# Patient Record
Sex: FEMALE | Race: WHITE | Employment: OTHER | URBAN - METROPOLITAN AREA
[De-identification: names, ages, dates, MRNs, and addresses within clinical notes are randomized per-mention and may not be internally consistent; named-entity substitution may affect disease eponyms.]

---

## 2022-03-18 PROBLEM — R10.9 ABDOMINAL PAIN: Status: ACTIVE | Noted: 2020-08-17

## 2022-03-18 PROBLEM — K80.20 CHOLELITHIASIS: Status: ACTIVE | Noted: 2020-08-17

## 2022-03-20 PROBLEM — R79.89 ELEVATED LFTS: Status: ACTIVE | Noted: 2020-08-17

## 2023-06-19 ENCOUNTER — OFFICE VISIT (OUTPATIENT)
Age: 28
End: 2023-06-19
Payer: COMMERCIAL

## 2023-06-19 VITALS — BODY MASS INDEX: 36.93 KG/M2 | HEIGHT: 66 IN

## 2023-06-19 DIAGNOSIS — M25.511 CHRONIC RIGHT SHOULDER PAIN: ICD-10-CM

## 2023-06-19 DIAGNOSIS — E66.9 OBESITY, UNSPECIFIED CLASSIFICATION, UNSPECIFIED OBESITY TYPE, UNSPECIFIED WHETHER SERIOUS COMORBIDITY PRESENT: ICD-10-CM

## 2023-06-19 DIAGNOSIS — G89.29 CHRONIC BILATERAL LOW BACK PAIN WITH BILATERAL SCIATICA: ICD-10-CM

## 2023-06-19 DIAGNOSIS — G89.29 CHRONIC RIGHT SHOULDER PAIN: ICD-10-CM

## 2023-06-19 DIAGNOSIS — M54.2 NECK PAIN: Primary | ICD-10-CM

## 2023-06-19 DIAGNOSIS — G57.13 MERALGIA PARESTHETICA OF BOTH LOWER EXTREMITIES: ICD-10-CM

## 2023-06-19 DIAGNOSIS — M41.25 IDIOPATHIC SCOLIOSIS OF THORACOLUMBAR REGION: ICD-10-CM

## 2023-06-19 DIAGNOSIS — M54.41 CHRONIC BILATERAL LOW BACK PAIN WITH BILATERAL SCIATICA: ICD-10-CM

## 2023-06-19 DIAGNOSIS — M54.42 CHRONIC BILATERAL LOW BACK PAIN WITH BILATERAL SCIATICA: ICD-10-CM

## 2023-06-19 PROCEDURE — 99204 OFFICE O/P NEW MOD 45 MIN: CPT | Performed by: FAMILY MEDICINE

## 2023-06-19 ASSESSMENT — PATIENT HEALTH QUESTIONNAIRE - PHQ9
1. LITTLE INTEREST OR PLEASURE IN DOING THINGS: 0
SUM OF ALL RESPONSES TO PHQ QUESTIONS 1-9: 0
SUM OF ALL RESPONSES TO PHQ9 QUESTIONS 1 & 2: 0
2. FEELING DOWN, DEPRESSED OR HOPELESS: 0
SUM OF ALL RESPONSES TO PHQ QUESTIONS 1-9: 0

## 2023-06-19 NOTE — PATIENT INSTRUCTIONS
Issues:  - decreased range of motion of neck   - nerve tension in your arms (they are supposed to slide)  - RIGHT shoulder subacromial impingement  - scoliosis -> limitations in thoracic and lumbar range of motion  - sacroiliac joint dysfunction RIGHT > LEFT   - LEFT greater troch bursitis  - meralgia paresthesia      To Do:  - x-rays: neck, RIGHT shoulder, low back  - sample from my home exercise plan menu (I'll place * next to the ones I think you should start with)    Short-term goal(s):  - less numbness/tingling in the arms  - less frequent back spasm episodes    Long-term goal(s):  - awareness of how to [employer] tower\" to optimize your mechanics for work (and \"for life\" really)  - learn techniques on how to relieve symptoms that may occur when you are pregnant, since your body will be changing constantly for a while when that happens        VISIT SURVEY       You may receive a survey regarding your visit today either by mail or email. Please take the opportunity to let us know how we did. This information helps us continue to improve and provide a great patient experience. TESTING / IMAGING RESULTS       If you have [employer] access:  Per federal regulations all of your results will be released to you and to your physician / provider simultaneously on [address] This means that it is likely that you will have the opportunity to review your results before your physician / provider! Since all \"critical\" abnormal results are immediately called to the office / on-call providers on nights, weekends and holidays - please refrain from calling after hours when you receive abnormal results through [address] Instead, allow time for your physician / provider to review your results and then provide interpretation and/or guidance. If the results are significantly abnormal and require time-sensitive action - guidance will be provided both via [employer] and via telephone.   For all other results

## 2023-06-19 NOTE — PROGRESS NOTES
Charo Acostaodalysradha presents today for   Chief Complaint   Patient presents with    Back Pain    Shoulder Pain     right    Neck Pain       Is someone accompanying this pt? no    Is the patient using any DME equipment during OV? no    Depression Screening:  No flowsheet data found. Learning Assessment:  No flowsheet data found. Abuse Screening:  No flowsheet data found. Fall Risk  No flowsheet data found. OPIOID RISK TOOL  No flowsheet data found. Coordination of Care:  1. Have you been to the ER, urgent care clinic since your last visit? Yes [facility]  Hospitalized since your last visit? no    2. Have you seen or consulted any other health care providers outside of the [address] [place] [address] since your last visit? no Include any pap smears or colon screening.  no
numbness/tingling occurs 4-5x/day; which causes (+) sleep disturbance    Back episodes:  Every few months lasting a few days  Uses: stretches / hamstring, heating pad      Least pain over the last 2 weeks has been 0 of 10  Worst pain over the last 2 weeks has been 8 of 10 - spasms - low back   Current 0 of 10    Aggravating factors:   Side lying, standing or sitting worsens lateral thigh numbness    Alleviating factors: With some weight loss, some techniques from physical therapy    Neurologic symptoms:   (+) numbness, tingling  (-) weakness  (+)  bowel or bladder changes \"it physically hurts to push the poop out when it is that painful\"      Prior Treatments for this complaint:   1.5yrs of bracing in middle school / Dr. Dominic Erazo @ [facility]    Previous Medications for this complaint:   MSK relaxants  Cyclobenzaprine (Flexeril)  naproxen    Current Medications for this complaint:  none    Previous work-up for this complaint has included:   None within the last few years       has a past medical history of Hypothyroid, PCOS (polycystic ovarian syndrome), and Scoliosis. Past Surgical History:   Procedure Laterality Date    CHOLECYSTECTOMY  2020    WISDOM TOOTH EXTRACTION        reports that she has never smoked. She has never been exposed to tobacco smoke. She has never used smokeless tobacco. She reports current alcohol use. She reports that she does not use drugs.   Family History   Problem Relation Age of Onset    Stroke Mother     Hypertension Mother     Hyperparathyroidism  Mother     Ovarian Cancer Maternal Grandmother     Heart Attack Maternal Grandfather      Allergies   Allergen Reactions    Guava Flavor Other (See Comments)     breakout    Mangifera Indica Swelling    Eduardo Flavor Other (See Comments)     breakout    Papaya Swelling    Papaya Derivatives Other (See Comments)     breakout    Passion Fruit Flavor Other (See Comments)     breakout       Problem List:     Patient Active Problem List   Diagnosis

## 2023-07-03 ENCOUNTER — HOSPITAL ENCOUNTER (OUTPATIENT)
Facility: HOSPITAL | Age: 28
Setting detail: RECURRING SERIES
Discharge: HOME | End: 2023-07-06
Payer: COMMERCIAL

## 2023-07-03 PROCEDURE — 97162 PT EVAL MOD COMPLEX 30 MIN: CPT
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
## 2023-07-03 NOTE — PROGRESS NOTES
analyze and address functional mobility deficits, analyze and address ROM deficits, analyze and address strength deficits, analyze and address soft tissue restrictions, analyze and cue for proper movement patterns, analyze and modify for postural abnormalities, and instruct in home and community integration to address functional deficits and attain remaining goals.     Progress toward goals / Updated goals:  [x]  See POC      PLAN  yes Continue plan of care  []  Upgrade activities as tolerated  []  Discharge due to :  []  Other:    Maulik Braun PT    7/3/2023    9:46 AM    Future Appointments   Date Time Provider [address] [place] [address]   7/3/2023 11:00 AM CHAPIN Dorantes

## 2023-07-17 ENCOUNTER — APPOINTMENT (OUTPATIENT)
Facility: HOSPITAL | Age: 28
End: 2023-07-17
Payer: COMMERCIAL

## 2023-07-24 ENCOUNTER — APPOINTMENT (OUTPATIENT)
Facility: HOSPITAL | Age: 28
End: 2023-07-24
Payer: COMMERCIAL

## 2023-08-01 ENCOUNTER — HOSPITAL ENCOUNTER (OUTPATIENT)
Facility: HOSPITAL | Age: 28
Setting detail: RECURRING SERIES
Discharge: HOME | End: 2023-08-04
Payer: COMMERCIAL

## 2023-08-01 PROCEDURE — 97110 THERAPEUTIC EXERCISES: CPT

## 2023-08-01 PROCEDURE — 97530 THERAPEUTIC ACTIVITIES: CPT

## 2023-08-01 PROCEDURE — 97112 NEUROMUSCULAR REEDUCATION: CPT

## 2023-08-04 NOTE — PROGRESS NOTES
PHYSICAL / OCCUPATIONAL THERAPY - DAILY TREATMENT NOTE (updated )  For Eval visit    Patient Name: Amanda Brown    Date: 2023    : 1995  Insurance: Payor: Garrett Crawford / Plan: OPTIMA HMO / Product Type: *No Product type* /      Patient  verified yes     Visit #   Current / Total 2 4   Time   In / Out 9:47 10:43   Pain   In / Out 3/10 back  4/10 shoulder  1/10 neck 3/10 back  0/10 shoulder  2/10 neck   Subjective Functional Status/Changes: Pt reports that she is in the process of fertility treatment during current treatment and is advised to not complete strenuous activities during this time. Pt is absent during this reporting period d/t financial troubles, plans to continue x1/ week     TREATMENT AREA =  see POC    OBJECTIVE  Modalities Rationale:     decrease inflammation and decrease pain to improve patient's ability to progress to PLOF and address remaining functional goals. min [] Estim Unattended, type/location:                                      []  w/ice    []  w/heat    min [] Estim Attended, type/location:                                     []  w/US     []  w/ice    []  w/heat    []  TENS insruct      min []  Mechanical Traction: type/lbs                   []  pro   []  sup   []  int   []  cont    []  before manual    []  after manual    min []  Ultrasound, settings/location:      min []  Iontophoresis w/ dexamethasone, location:                                               []  take home patch       []  in clinic   10 min  unbill []  Ice     [x]  Heat    location/position:     min []  Paraffin,  details:     min []  Vasopneumatic Device, press/temp:     min []  Kalyn Sidhu / Kim De Paz:     If using vaso (only need to measure limb vaso being performed on)      pre-treatment girth :       post-treatment girth :       measured at (landmark location) :      min []  Other:    Skin assessment post-treatment (if applicable):    []  intact    []  redness- no adverse reaction
visual trembling at 7\"  Improve B hip flexor flexibility by at least 10 deg to reduce B LE paresthesias  Current PN: Hip flexor flexibility (R) -7 (L) -7  Improve mid/low trap MMT by at least 1/3 grade to improve posture when performing work tasks        Current PN: Mid trap (R) 4/5 (L) 4/5, Low trap (R) 3+/5 (L) 3+/5  Improve FOTO Score to >/= 71/100 (shoulder) to indicate improved function  Current PN: assess at next PN or in 30 days  Improve FOTO score to >/= 65/100 (back) to indicate improved function  Current PN: assess at next PN or in 30 days      Frequency / Duration:   Patient to be seen   1   times per week for   4    weeks:    RECOMMENDATIONS  We would like to continue therapy for progress to goals stated above. Continue per initial Plan of Care. If you have any questions/comments please contact us directly. Thank you for allowing us to assist in the care of your patient.     Elkin Andre, ASHLEY       8/4/2023       11:56 AM

## 2023-08-17 ENCOUNTER — HOSPITAL ENCOUNTER (OUTPATIENT)
Facility: HOSPITAL | Age: 28
Setting detail: RECURRING SERIES
Discharge: HOME | End: 2023-08-20
Payer: COMMERCIAL

## 2023-08-17 PROCEDURE — 97140 MANUAL THERAPY 1/> REGIONS: CPT

## 2023-08-17 PROCEDURE — 97530 THERAPEUTIC ACTIVITIES: CPT

## 2023-08-17 PROCEDURE — 97110 THERAPEUTIC EXERCISES: CPT

## 2023-08-17 NOTE — PROGRESS NOTES
increased tension in L>R upper trap and levator scapulae with manual stretching/intervention today. Pt did report increased relief from manual intervention focused to cervical versus lumbar region today. Will review detailed progress/goals for physician update/progress note next treatment with continuing to progress/advance as tolerated within current POC. Patient will continue to benefit from skilled PT / OT services to modify and progress therapeutic interventions, analyze and address functional mobility deficits, analyze and address ROM deficits, analyze and address strength deficits, analyze and address soft tissue restrictions, analyze and cue for proper movement patterns, analyze and modify for postural abnormalities, and instruct in home and community integration to address functional deficits and attain remaining goals.     Progress toward goals / Updated goals:  []  See Progress Note/Recertification  New Goals to be achieved in 4 weeks:  Pt will be ind and compliant with HEP for self management of sx  Curret PN: HEP updated at today's visit, reports semi compliance with initial HEP 8/17/23: Met, pt reports increased compliance with HEP since last visit  Pt will obtain B wrist night splints to reduce compression to carpal tunnel and improve ability to perform fine motor tasks  Current PN: Pt has not received wrist splints since eval, planning on obtaining splints before next visit  Pt will maintain deep cervical flexion > 15\" without compensation to improve neck stability when performing ADLs  Current PN: 10\" before compensation, visual trembling at 7\"  Improve B hip flexor flexibility by at least 10 deg to reduce B LE paresthesias  Current PN: Hip flexor flexibility (R) -7 (L) -7  Improve mid/low trap MMT by at least 1/3 grade to improve posture when performing work tasks        Current PN: Mid trap (R) 4/5 (L) 4/5, Low trap (R) 3+/5 (L) 3+/5  Improve FOTO Score to >/= 71/100 (shoulder) to indicate

## 2023-08-28 ENCOUNTER — APPOINTMENT (OUTPATIENT)
Facility: HOSPITAL | Age: 28
End: 2023-08-28
Payer: COMMERCIAL

## 2023-09-11 ENCOUNTER — HOSPITAL ENCOUNTER (OUTPATIENT)
Facility: HOSPITAL | Age: 28
Setting detail: RECURRING SERIES
Discharge: HOME | End: 2023-09-14
Payer: COMMERCIAL

## 2023-09-11 PROCEDURE — 97530 THERAPEUTIC ACTIVITIES: CPT

## 2023-09-11 PROCEDURE — 97110 THERAPEUTIC EXERCISES: CPT

## 2023-09-11 NOTE — PROGRESS NOTES
PHYSICAL / OCCUPATIONAL THERAPY - DAILY TREATMENT NOTE (updated )    Patient Name: Kristy Morgan    Date: 2023    : 1995  Insurance: Payor: Coleman Vaca / Plan: OPTIMA HMO / Product Type: *No Product type* /      Patient  verified Yes     Visit #   Current / Total 4 4   Time   In / Out 1:00 2:10   Pain   In / Out 4- back, 6-neck,  7-shoulder 2-back  6- neck  4- shoulder   Subjective Functional Status/Changes: See d/c     TREATMENT AREA =  Cervicalgia [M54.2]  Other low back pain [M54.59]  Pain in right shoulder [M25.511]    OBJECTIVE    Modalities Rationale:     decrease pain and increase tissue extensibility to improve patient's ability to progress to PLOF and address remaining functional goals. min [] Estim Unattended, type/location:                                      []  w/ice    []  w/heat    min [] Estim Attended, type/location:                                     []  w/US     []  w/ice    []  w/heat    []  TENS insruct      min []  Mechanical Traction: type/lbs                   []  pro   []  sup   []  int   []  cont    []  before manual    []  after manual    min []  Ultrasound, settings/location:      min []  Iontophoresis w/ dexamethasone, location:                                               []  take home patch       []  in clinic   10 min  unbill []  Ice     [x]  Heat    location/position: Cervical/lumbar in sitting     min []  Paraffin,  details:     min []  Vasopneumatic Device, press/temp:     min []  Emily Ragyoza / Víctor Dayhoff: If using vaso (only need to measure limb vaso being performed on)      pre-treatment girth :       post-treatment girth :       measured at (landmark location) :      min []  Other:     Skin assessment post-treatment (if applicable):    [x]  intact    [x]  redness- no adverse reaction                 []redness - adverse reaction:         Therapeutic Procedures:     Tx Min Billable or 1:1 Min (if diff from Tx Min) Procedure, Rationale, Specifics   [phone]

## 2023-09-11 NOTE — PROGRESS NOTES
[facility]  [address] [address], [place] [facility]  Phone: [phone]   [phone]  PHYSICAL THERAPY DISCHARGE SUMMARY  Patient Name: Fara Valdes : 1995   Treatment/Medical Diagnosis: Cervicalgia [M54.2]  Other low back pain [M54.59]  Pain in right shoulder [M25.511]   Referral Source: Marky Shah [place]*     Date of Initial Visit: 7/3/2023 Attended Visits: 4 Missed Visits: 0     SUMMARY OF TREATMENT  Pt is a 31 y/o F who presents to PT w/ c/o neck, R shoulder, low back pain. Treatment Plan may include any combination of the followin Therapeutic Exercise, 54455 Neuromuscular Re-Education, 92086 Manual Therapy, 20480 Therapeutic Activity, 57857 Self Care/Home Management, and 02759 Electrical Stim unattended    CURRENT STATUS  Pt reports recent traveling has with prolonged sitting and uncomfortable beds which exacerbated neck, shoulder and back pain. Pt reports 20% overall improvement in sx since beginning care. Improvements: \"I feel like my mobility has been pretty good it's just the discomfort\". Good improvement in b/l hip ext ROM (see LTG 4)  Remaining functional limitations: numbness in B UE L>R (both fingertips and left dorsal thumb), driving (especially turning head irritates neck), work (baking/decorating cakes), R s/l (R shoulder pain), lifting (LBP), hormonal changes (LBP)     Objective Measurements:  PAIN:   Neck:  current Max pain 7-8/10, Average daily pain 4/10, Least pain 2/10  (pain constant \"ache\")   R Shoulder:  current Max pain 7/10, Average daily pain 1/10, Least pain 0/10  (pain intermittent)   Low Back:  current Max pain 4/10, Average daily pain 0/10, Least pain 0/10  POSTURE/OBSERVATION: mild FHP, b/l scapula ant tipped and downward ROT, g/l GH IR.   Bridge ~75% single leg   TA iso Fair with supine march    AROM:  CERVICAL: Flex 40 (L neck pain), Ext 50. SB R 55 (L neck pain), L 55.   ROT R 80 (p!), L

## 2023-09-25 ENCOUNTER — APPOINTMENT (OUTPATIENT)
Facility: HOSPITAL | Age: 28
End: 2023-09-25
Payer: COMMERCIAL

## 2023-10-26 ENCOUNTER — TELEPHONE (OUTPATIENT)
Age: 28
End: 2023-10-26

## 2023-10-26 NOTE — TELEPHONE ENCOUNTER
----- Message from Kendy Soto sent at 10/24/2023 11:52 AM EDT -----  Subject: Appointment Request    Reason for Call: Established Patient Appointment needed: Routine Existing   Condition Follow Up    QUESTIONS    Reason for appointment request? Available appointments did not meet   patient need     Additional Information for Provider? Jae Mark  of ptChanel Neves Moni is the caller and would like an in office visit for   10/30/2023 for a routine existing condition follow up, medication review,   please callback pt. to further assist  ---------------------------------------------------------------------------  --------------  Eligio [place] Meli  [phone]; OK to leave message on voicemail,OK to respond with electronic   message via [facility] portal (only for patients who have registered [facility]   account)  ---------------------------------------------------------------------------  --------------  SCRIPT ANSWERS